# Patient Record
Sex: MALE | Race: BLACK OR AFRICAN AMERICAN | Employment: FULL TIME | URBAN - METROPOLITAN AREA
[De-identification: names, ages, dates, MRNs, and addresses within clinical notes are randomized per-mention and may not be internally consistent; named-entity substitution may affect disease eponyms.]

---

## 2017-03-02 ENCOUNTER — HOSPITAL ENCOUNTER (EMERGENCY)
Age: 22
Discharge: HOME | End: 2017-03-02
Attending: STUDENT IN AN ORGANIZED HEALTH CARE EDUCATION/TRAINING PROGRAM
Payer: COMMERCIAL

## 2017-03-02 VITALS
BODY MASS INDEX: 26.52 KG/M2 | TEMPERATURE: 98.3 F | OXYGEN SATURATION: 98 % | HEART RATE: 95 BPM | WEIGHT: 165 LBS | RESPIRATION RATE: 18 BRPM | SYSTOLIC BLOOD PRESSURE: 134 MMHG | DIASTOLIC BLOOD PRESSURE: 80 MMHG | HEIGHT: 66 IN

## 2017-03-02 DIAGNOSIS — R19.5 DECREASED STOOL CALIBER: Primary | ICD-10-CM

## 2017-03-02 PROCEDURE — 99282 EMERGENCY DEPT VISIT SF MDM: CPT

## 2017-03-02 NOTE — ED PROVIDER NOTES
HPI Comments: 79-year-old male otherwise healthy presents with decreased frequency of stools for the last 5 days. Patient notes that normally he has 2 bowel movements per day for the past few days he's only had one bowel movement per day. He has no pain vomiting or fever. No history of surgery, no history of Crohn's ulcerative colitis. He has last bowel movement was this morning it was a pleasure caliber than usual, formed stool. No pain when defecating. He has recently changed his diet as he was diving for the past couple of months and has resumed his normal diet of increased use of fast food. He did not have a great intake of fruit and vegetables. He is without complaint he is not taking any medications. Patient is a 24 y.o. male presenting with constipation. Constipation    Pertinent negatives include no abdominal pain and no vomiting. History reviewed. No pertinent past medical history. History reviewed. No pertinent surgical history. History reviewed. No pertinent family history. Social History     Social History    Marital status: SINGLE     Spouse name: N/A    Number of children: N/A    Years of education: N/A     Occupational History    Not on file. Social History Main Topics    Smoking status: Current Every Day Smoker    Smokeless tobacco: Not on file    Alcohol use No    Drug use: Yes     Special: Marijuana    Sexual activity: Not on file     Other Topics Concern    Not on file     Social History Narrative         ALLERGIES: Review of patient's allergies indicates no known allergies. Review of Systems   Gastrointestinal: Negative for abdominal pain and vomiting. All other systems reviewed and are negative. Vitals:    03/02/17 1237   BP: 134/80   Pulse: 95   Resp: 18   Temp: 98.3 °F (36.8 °C)   SpO2: 98%   Weight: 74.8 kg (165 lb)   Height: 5' 6\" (1.676 m)            Physical Exam   Constitutional: He is oriented to person, place, and time.  He appears well-developed and well-nourished. HENT:   Head: Normocephalic and atraumatic. Eyes: Conjunctivae and EOM are normal. Pupils are equal, round, and reactive to light. Neck: Normal range of motion. Neck supple. Cardiovascular: Normal rate and regular rhythm. Pulmonary/Chest: Effort normal and breath sounds normal.   Abdominal: Soft. There is no tenderness. There is no rebound and no guarding. Musculoskeletal: Normal range of motion. Neurological: He is alert and oriented to person, place, and time. Skin: Skin is warm and dry. Psychiatric: He has a normal mood and affect. Nursing note and vitals reviewed. MDM  Number of Diagnoses or Management Options  Decreased stool caliber:   Diagnosis management comments: Decreased frequency stool - abd soft/nt, no c/o pain, no risks for partial obstruction, looks great, patient wanted guidance what he could take to increase frequency/caliber of stool.      ED Course       Procedures

## 2017-03-02 NOTE — ED TRIAGE NOTES
Pt arrives with constipation for 2 days. Has not taken anything for it, states \"I just don't know what I should take\". Last BM yesterday.

## 2019-05-22 ENCOUNTER — HOSPITAL ENCOUNTER (EMERGENCY)
Age: 24
Discharge: HOME | End: 2019-05-22
Attending: STUDENT IN AN ORGANIZED HEALTH CARE EDUCATION/TRAINING PROGRAM
Payer: COMMERCIAL

## 2019-05-22 VITALS
OXYGEN SATURATION: 99 % | WEIGHT: 162 LBS | TEMPERATURE: 97.8 F | HEIGHT: 66 IN | RESPIRATION RATE: 14 BRPM | SYSTOLIC BLOOD PRESSURE: 112 MMHG | HEART RATE: 66 BPM | BODY MASS INDEX: 26.03 KG/M2 | DIASTOLIC BLOOD PRESSURE: 72 MMHG

## 2019-05-22 DIAGNOSIS — T43.205A SELECTIVE SEROTONIN REUPTAKE INHIBITOR (SSRI) DISCONTINUATION SYNDROME: Primary | ICD-10-CM

## 2019-05-22 DIAGNOSIS — R00.2 PALPITATIONS: ICD-10-CM

## 2019-05-22 LAB
ATRIAL RATE: 55 BPM
CALCULATED P AXIS, ECG09: 38 DEGREES
CALCULATED R AXIS, ECG10: 9 DEGREES
CALCULATED T AXIS, ECG11: 17 DEGREES
DIAGNOSIS, 93000: NORMAL
P-R INTERVAL, ECG05: 140 MS
Q-T INTERVAL, ECG07: 384 MS
QRS DURATION, ECG06: 102 MS
QTC CALCULATION (BEZET), ECG08: 367 MS
VENTRICULAR RATE, ECG03: 55 BPM

## 2019-05-22 PROCEDURE — 99284 EMERGENCY DEPT VISIT MOD MDM: CPT

## 2019-05-22 PROCEDURE — 93005 ELECTROCARDIOGRAM TRACING: CPT

## 2019-05-22 NOTE — ED NOTES
Pt reports a \"jolt \" feeling in his heart that started around 1800 . Pt does not endorse any chest pain or SOB . No n/v . Pt is a&o x4 . VSS . ecg shows karthikeyan .

## 2019-05-22 NOTE — ED TRIAGE NOTES
Arrives complaining of \"heart skipping beats\" since around 6:30p. He stopped taking his Lexapro suddenly last week.

## 2019-05-22 NOTE — ED PROVIDER NOTES
24 y.o. male with past medical history significant for depression, anemia, who presents ambulatory to the ED accompanied by family member, with chief complaint of irregular heart beat. Pt complains of a \"jolt\" that he feels in his \"heart\" that radiates through his chest, arms and entire body, which started ~1800 this evening and occurs \"every few minutes\". He denies experiencing any chest pain, shortness of breath or palpitations. Pt reports that he had recently has his daily dosage of Lexapro decreased from 20 mg every day to 10 mg every day, and notes that he stopped taking the medication altogether last Thursday (5/16/19). He notes that he had been taking Lexapro for \"a couple of months\". Pt complains of sleep disturbance and increased sweating at night since he stopped taking the medication. He also complains of a \"slight\" headache and intermittent muscle \"cramps\". Denies any recent trauma. Family reports that patient has additional h/o panic attacks. Pt specifically denies lightheadedness, syncope, nausea, vomiting, fever or change in appetite. There are no other acute medical concerns at this time. Social hx: Negative for Tobacco use; Negative for EtOH use; Positive for Illicit Drug use (marijuana)  PCP: Katlyn, MD Katherine    Note written by Shara written by Duong Huff, as dictated by Elizabeth Nava MD 1:16 AM   Duong Pablo, as dictated by Elizabeth Nava MD 1:16 AM    The history is provided by a relative, the patient and medical records. No  was used. Past Medical History:   Diagnosis Date    Anemia     Depression     Ill-defined condition     broken neck but no surgery       Past Surgical History:   Procedure Laterality Date    HX ORTHOPAEDIC      repair of ankle and pelvis         History reviewed. No pertinent family history.     Social History     Socioeconomic History    Marital status: SINGLE     Spouse name: Not on file    Number of children: Not on file    Years of education: Not on file    Highest education level: Not on file   Occupational History    Not on file   Social Needs    Financial resource strain: Not on file    Food insecurity:     Worry: Not on file     Inability: Not on file    Transportation needs:     Medical: Not on file     Non-medical: Not on file   Tobacco Use    Smoking status: Never Smoker   Substance and Sexual Activity    Alcohol use: No    Drug use: Yes     Types: Marijuana    Sexual activity: Not on file   Lifestyle    Physical activity:     Days per week: Not on file     Minutes per session: Not on file    Stress: Not on file   Relationships    Social connections:     Talks on phone: Not on file     Gets together: Not on file     Attends Evangelical service: Not on file     Active member of club or organization: Not on file     Attends meetings of clubs or organizations: Not on file     Relationship status: Not on file    Intimate partner violence:     Fear of current or ex partner: Not on file     Emotionally abused: Not on file     Physically abused: Not on file     Forced sexual activity: Not on file   Other Topics Concern    Not on file   Social History Narrative    Not on file         ALLERGIES: Patient has no known allergies. Review of Systems   Constitutional: Positive for diaphoresis. Negative for appetite change, chills, fatigue and fever. HENT: Negative for ear pain, sore throat and trouble swallowing. Eyes: Negative for visual disturbance. Respiratory: Negative for cough and shortness of breath. Cardiovascular: Negative for chest pain and palpitations. Gastrointestinal: Negative for abdominal pain, nausea and vomiting. Genitourinary: Negative for dysuria. Musculoskeletal: Positive for myalgias (generalized). Negative for back pain. Skin: Negative for rash. Neurological: Positive for headaches. Negative for syncope and light-headedness.    Psychiatric/Behavioral: Positive for sleep disturbance. Negative for confusion. All other systems reviewed and are negative. Vitals:    05/22/19 0044 05/22/19 0114 05/22/19 0117   BP: 130/75 122/58    Pulse: 65 (!) 59    Resp: 20 17    Temp: 97.9 °F (36.6 °C)     SpO2: 100% 100% 99%   Weight:  73.5 kg (162 lb)    Height:  5' 6\" (1.676 m)             Physical Exam   Constitutional: He appears well-developed. HENT:   Head: Normocephalic and atraumatic. Eyes: EOM are normal.   Neck: No neck rigidity. Cardiovascular: Normal rate, regular rhythm and intact distal pulses. Pulmonary/Chest: Effort normal and breath sounds normal.   Abdominal: He exhibits no distension. There is no tenderness. Musculoskeletal: He exhibits no edema. Occasional total body twitching. Neurological: He is alert. No cranial nerve deficit. Skin: Skin is warm. He is not diaphoretic. Psychiatric: He has a normal mood and affect. Nursing note and vitals reviewed. Note written by Duong Jones, as dictated by Isabelle Lopez MD 1:16 AM    MDM  Number of Diagnoses or Management Options  Palpitations:   Selective serotonin reuptake inhibitor (SSRI) discontinuation syndrome:   Diagnosis management comments: Corrinne Gault is a 24 y.o. male presenting with intermittent twitching, possible palpitations, diaphoresis, . Differential includes ssri discontinuation, less likely arrhythmia, endocrinopathy, electrolyte abnormality. Course: stable  ekg unremarkable  No evidence of arrhythmia on telemetry  Share decision making with pt regarding labs as outpatient vs in the ED -- will follow up with primary care and obtain thyroid function and basic labs  Dispo: dc.           Procedures    ED EKG interpretation:  Time: 00:45  Rhythm: sinus bradycardia; Rate (approx.): 55 bpm; Axis: normal; ST/T wave: no ST or T wave changes; Note written by Duong Jones, as dictated by Isabelle Lopez MD 1:40 AM    1:40 AM  Patient's results have been reviewed with them. Patient and/or family have verbally conveyed their understanding and agreement of the patient's signs, symptoms, diagnosis, treatment and prognosis and additionally agree to follow up as recommended or return to the Emergency Room should their condition change prior to follow-up. Discharge instructions have also been provided to the patient with some educational information regarding their diagnosis as well a list of reasons why they would want to return to the ER prior to their follow-up appointment should their condition change.